# Patient Record
Sex: MALE | Race: OTHER | HISPANIC OR LATINO | URBAN - METROPOLITAN AREA
[De-identification: names, ages, dates, MRNs, and addresses within clinical notes are randomized per-mention and may not be internally consistent; named-entity substitution may affect disease eponyms.]

---

## 2024-04-01 ENCOUNTER — EMERGENCY (EMERGENCY)
Facility: HOSPITAL | Age: 24
LOS: 1 days | Discharge: HOME | End: 2024-04-01
Attending: EMERGENCY MEDICINE
Payer: SELF-PAY

## 2024-04-01 VITALS
TEMPERATURE: 98 F | RESPIRATION RATE: 18 BRPM | WEIGHT: 275.14 LBS | SYSTOLIC BLOOD PRESSURE: 131 MMHG | HEART RATE: 98 BPM | DIASTOLIC BLOOD PRESSURE: 85 MMHG | OXYGEN SATURATION: 100 %

## 2024-04-01 PROCEDURE — 96372 THER/PROPH/DIAG INJ SC/IM: CPT

## 2024-04-01 PROCEDURE — 99283 EMERGENCY DEPT VISIT LOW MDM: CPT

## 2024-04-01 RX ORDER — KETOROLAC TROMETHAMINE 30 MG/ML
30 SYRINGE (ML) INJECTION ONCE
Refills: 0 | Status: DISCONTINUED | OUTPATIENT
Start: 2024-04-01 | End: 2024-04-01

## 2024-04-01 RX ORDER — OXYCODONE HYDROCHLORIDE 5 MG/1
1 TABLET ORAL
Qty: 12 | Refills: 0
Start: 2024-04-01 | End: 2024-04-03

## 2024-04-01 RX ORDER — ONDANSETRON 8 MG/1
1 TABLET, FILM COATED ORAL
Qty: 9 | Refills: 0
Start: 2024-04-01 | End: 2024-04-03

## 2024-04-01 RX ADMIN — Medication 30 MILLIGRAM(S): at 22:40

## 2024-04-01 NOTE — ED PROVIDER NOTE - PHYSICAL EXAMINATION
Constitutional: Awake, alert and oriented. In no acute distress. Well appearing.  HEENT: NC/AT. Moist mucous membranes. (+) tooth #45 partially chipped  without any active discharge or swelling. No stridor/drooling/trismus.   Eyes: No scleral icterus. EOMI.  Neck:. Soft and supple. Full ROM without pain.  Cardiac: Regular rate and regular rhythm. +S1/S2. Peripheral pulses 2+ and symmetric. No LE edema.  Respiratory: Speaking in full sentences. No evidence of respiratory distress. No wheezes, rales or rhonchi.  Abdomen: Soft, non-distended and non tender Normal bowel sounds in all 4 quadrants.   Back: Spine midline and non-tender.   Skin: No rashes, abrasions or lacerations.  Lymph: No cervical lymphadenopathy.  Neuro: Awake, alert & oriented x 3.  Psych: calm, cooperative, normal affect

## 2024-04-01 NOTE — ED ADULT TRIAGE NOTE - CHIEF COMPLAINT QUOTE
pt report having discharge from right side of mouth/gum where filling fell out since this m. Took Tylenol for pain. Denies fever or chills

## 2024-04-01 NOTE — ED PROVIDER NOTE - NSFOLLOWUPINSTRUCTIONS_ED_ALL_ED_FT
Please take all medications as prescribed  Do NOT drive and do NOT operate any heavy machinery while taking oxycodone as it might cause drowsiness.  1)Follow up with dental clinic within 1-2 days  Return to the emergency room if you are experiencing any new or worsening symptoms

## 2024-04-01 NOTE — ED PROVIDER NOTE - CARE PROVIDER_API CALL
[facility], DENTAL CLINIC  [phone]  [employer]  [address] [place] [address]  Phone: [phone]  Fax: (   )    -  Follow Up Time: Urgent

## 2024-04-01 NOTE — ED PROVIDER NOTE - OBJECTIVE STATEMENT
23 y/o M with PMHx diverticulitis (currently on augmentin day #3) p/w c/o right dental pain (tooth # 45) today. Pt reports brushing his teeth this AM when his dental filling fell out, since then experiencing sharp dental pain. Took tylenol, chlorhexidine, lidocaine gel with no relief. Also tried partial dental sealant with no relief. Denies discharge from tooth, fever/chills, stridor/drooling/trismus, odynophagia, dysphagia, n/v, sore throat, abdominal pain, back pain, dizziness, headaches, neck stiffness, and with no other c/o.

## 2024-04-01 NOTE — ED PROVIDER NOTE - PATIENT PORTAL LINK FT
You can access the FollowMyHealth Patient Portal offered by [facility] by registering at the following website: [email or web address]. By joining [employer]’s FollowMyHealth portal, you will also be able to view your health information using other applications (apps) compatible with our system.

## 2024-04-01 NOTE — ED PROVIDER NOTE - CLINICAL SUMMARY MEDICAL DECISION MAKING FREE TEXT BOX
23 y/o M with PMHx diverticulitis (currently on augmentin day #3) p/w c/o right dental pain (tooth # 45) today. Offered dental block but patient refused as he is scared of needles. Rx oxycodone sent to pharmacy. Instructed to f/u with dentist within 1-2 days and instructed to c/w augmentin. Strict ED return precautions given if any new or worsening symptoms.

## 2024-04-01 NOTE — ED PROVIDER NOTE - PROVIDER TOKENS
FREE:[LAST:[[facility]],FIRST:[DENTAL CLINIC],PHONE:[phone]],FAX:[(   )    -],ADDRESS:[phone]  [employer]  [address] [place] [address]],FOLLOWUP:[Urgent]]

## 2024-08-02 NOTE — ED PROVIDER NOTE - PSYCHIATRIC NEGATIVE STATEMENT, MLM
From: Leodan Alcaraz  To: Dr. Naty Brantley  Sent: 7/23/2024 8:53 AM EDT  Subject: test results    can you let me know if things are getting better with my blood and iron. Here is the latest test that was done.   
Leodan Retana \"Otf\"  P [facility] Practice Support (supporting Naty Brantley DO)48 minutes ago (10:22 AM)       I just got the info from the surgeon and I have not looked at my calendar yet but this take presentence over everything else. My hip is hurting badly these days. I am sending you the file so you can help me with the doctor part.  Thanks for all your help and for being there for me.     I am waiting on the surgeon to email me with a list of what I need to do before the 20th. That is the date for my surgery. I will keep you updated for that.     Otf Retana [id number]   [facility] RETANA SURGERY PACKET.pdf    Patient had pre-op on 7/22/24 for right hip surgery with Dr Fraser with Dr Velez.  Will that suffice for surgery of 8/20/24 or does he need to schedule another pre-op??  
Please place order for colonoscopy  
no known mental health issues.